# Patient Record
Sex: FEMALE | Race: WHITE | NOT HISPANIC OR LATINO | Employment: FULL TIME | URBAN - METROPOLITAN AREA
[De-identification: names, ages, dates, MRNs, and addresses within clinical notes are randomized per-mention and may not be internally consistent; named-entity substitution may affect disease eponyms.]

---

## 2019-08-08 RX ORDER — LISINOPRIL 40 MG/1
TABLET ORAL
Qty: 30 TABLET | Refills: 0 | Status: SHIPPED | OUTPATIENT
Start: 2019-08-08 | End: 2019-09-12 | Stop reason: SDUPTHER

## 2019-08-08 RX ORDER — FENOFIBRATE 48 MG/1
TABLET, COATED ORAL
Qty: 30 TABLET | Refills: 5 | Status: SHIPPED | OUTPATIENT
Start: 2019-08-08

## 2019-09-12 RX ORDER — LISINOPRIL 40 MG/1
TABLET ORAL
Qty: 30 TABLET | Refills: 0 | Status: SHIPPED | OUTPATIENT
Start: 2019-09-12

## 2020-01-13 RX ORDER — ATORVASTATIN CALCIUM 40 MG/1
TABLET, FILM COATED ORAL
Qty: 10 TABLET | Refills: 0 | Status: SHIPPED | OUTPATIENT
Start: 2020-01-13

## 2020-04-16 ENCOUNTER — TELEMEDICINE (OUTPATIENT)
Dept: CARDIOLOGY | Facility: CLINIC | Age: 40
End: 2020-04-16

## 2020-04-16 VITALS — WEIGHT: 240 LBS | HEIGHT: 62 IN | BODY MASS INDEX: 44.16 KG/M2

## 2020-04-16 DIAGNOSIS — E78.2 MIXED HYPERLIPIDEMIA: ICD-10-CM

## 2020-04-16 DIAGNOSIS — R07.9 CHEST PAIN, UNSPECIFIED TYPE: Primary | ICD-10-CM

## 2020-04-16 DIAGNOSIS — I10 ESSENTIAL HYPERTENSION: ICD-10-CM

## 2020-04-16 PROCEDURE — 99213 OFFICE O/P EST LOW 20 MIN: CPT | Performed by: INTERNAL MEDICINE

## 2020-04-16 RX ORDER — HYDROCHLOROTHIAZIDE 25 MG/1
25 TABLET ORAL DAILY
COMMUNITY
Start: 2020-04-03

## 2020-04-16 RX ORDER — BUPROPION HYDROCHLORIDE 150 MG/1
150 TABLET ORAL DAILY
COMMUNITY

## 2020-04-16 RX ORDER — METOPROLOL SUCCINATE 50 MG/1
50 TABLET, EXTENDED RELEASE ORAL DAILY
COMMUNITY
Start: 2020-04-03

## 2020-04-16 RX ORDER — AMLODIPINE BESYLATE 5 MG/1
5 TABLET ORAL DAILY
Qty: 30 TABLET | Refills: 11 | Status: SHIPPED | OUTPATIENT
Start: 2020-04-16

## 2020-04-16 NOTE — PROGRESS NOTES
"Cardiology Virtual Video Visit      Encounter Date:  04/16/2020    Patient ID:   Radha Infante is a 40 y.o. female.    Reason For Followup:  Hypertension  Uncontrolled hypertension  Episode where patient felt tired and not doing well        Brief Clinical History:  Dear Dr. Dikc, JEFFERY Augustine    I had the pleasure of seeing Radha Infante today in a virtual video format. As you are well aware, this is a 40 y.o. female for office visit via the video conference for help with uncontrolled hypertension and also not feeling well    Interval History:  Patient states she had an episode where she felt very tired and had no energy lasted for several hours this was few days back  Also complaining of not feeling well  Elevated blood pressure  Recent labs done at the primary care physician office in 2 weeks back    Assessment & Plan    Impressions:  Hypertension  Uncontrolled hypertension  Episode where patient felt tired and not doing well  Prior cardiac catheterization with no significant obstructive coronary artery disease      Recommendations:  Get copy of the labs  Patient does not want to be admitted to the hospital or go to the emergency room for further evaluation treatment options secondary to the COVID outbreak concerns  This is a video visit  Patient gave consent for the video visit  Home blood pressure monitoring  Start patient on Norvasc 5 mg p.o. once a day  We spent 35 minutes with the patient for the video visit  You have chosen to receive care through a telehealth visit.  Do you consent to use a video/audio connection for your medical care today? Yes    Objective:    Vitals:  Vitals:    04/16/20 1142   Weight: 109 kg (240 lb)   Height: 157.5 cm (62\")         Allergies:  Allergies   Allergen Reactions   • Iodinated Diagnostic Agents Unknown - High Severity       Medication Review:     Current Outpatient Medications:   •  atorvastatin (LIPITOR) 40 MG tablet, TAKE 1 TABLET BY MOUTH AT BEDTIME , " Disp: 10 tablet, Rfl: 0  •  buPROPion XL (Wellbutrin XL) 150 MG 24 hr tablet, Take 150 mg by mouth Daily., Disp: , Rfl:   •  fenofibrate (TRICOR) 48 MG tablet, TAKE 1 TABLET BY MOUTH ONE TIME A DAY , Disp: 30 tablet, Rfl: 5  •  hydroCHLOROthiazide (HYDRODIURIL) 25 MG tablet, Take 25 mg by mouth Daily., Disp: , Rfl:   •  lisinopril (PRINIVIL,ZESTRIL) 40 MG tablet, TAKE 1 TABLET BY MOUTH ONE TIME A DAY , Disp: 30 tablet, Rfl: 0  •  metoprolol succinate XL (TOPROL-XL) 50 MG 24 hr tablet, Take 50 mg by mouth Daily., Disp: , Rfl:     Family History:  Family History   Problem Relation Age of Onset   • Hypertension Mother    • Diabetes Father    • Hypertension Father    • Hyperlipidemia Father    • Diabetes Maternal Grandmother    • Heart disease Maternal Grandmother    • Heart disease Maternal Grandfather    • Heart attack Maternal Grandfather    • Hypertension Maternal Grandfather    • Stroke Maternal Grandfather    • Cancer Maternal Grandfather        Past Medical History:  Past Medical History:   Diagnosis Date   • Hyperlipidemia    • Hypertension    • MTHFR mutation (methylenetetrahydrofolate reductase) (CMS/HCC)    • Plantar fasciitis    • S/P PDA repair        Past surgical History:  Past Surgical History:   Procedure Laterality Date   • CARDIAC CATHETERIZATION     •  SECTION     • D&C AND LAPAROSCOPY     • PATENT DUCTUS ARTERIOUS LIGATION         Social History:  Social History     Socioeconomic History   • Marital status:      Spouse name: Not on file   • Number of children: Not on file   • Years of education: Not on file   • Highest education level: Not on file   Tobacco Use   • Smoking status: Never Smoker   • Smokeless tobacco: Never Used   Substance and Sexual Activity   • Alcohol use: Never     Frequency: Never   • Drug use: Never   • Sexual activity: Defer       Review of Systems:  The following systems were reviewed as they relate to the cardiovascular system: Constitutional, Eyes, ENT,  Cardiovascular, Respiratory, Gastrointestinal, Integumentary, Neurological, Psychiatric, Hematologic, Endocrine, Musculoskeletal, and Genitourinary. The pertinent cardiovascular findings are reported above with all other cardiovascular points within those systems being negative.    Diagnostic Study Review:     Current Electrocardiogram:  Procedures      NOTE: The following portions of the patient's history were reviewed and updated this visit as appropriate: allergies, current medications, past family history, past medical history, past social history, past surgical history and problem list.    A total of 25 minutes of medical discussion occurred during this encounter.      Novel Coronavirus (COVID-19) Disclaimer:     A proclamation declaring a national emergency concerning the Novel Coronavirus Disease (COVID-19) Outbreak was issued on March 13, 2020 at the direction of the .    This virtual visit was performed with the patient's consent in lieu of the patient's regularly scheduled appointment in order to provide the patient with the opportunity to maintain contact with their healthcare provider while also adhering to social distancing guidelines set forth by the CDC to reduce exposure to the Novel Coronavirus (COVID-19).  Any vital signs obtained during this visit were provided by the patient.

## 2020-05-14 ENCOUNTER — TELEPHONE (OUTPATIENT)
Dept: CARDIOLOGY | Facility: CLINIC | Age: 40
End: 2020-05-14

## 2020-05-14 NOTE — TELEPHONE ENCOUNTER
L/M for Pt to return my call. Pt had requested a letter for PT stating that it was safe for her to do active PT if BP is high. Dr. Esteban is okay with writing the letter.

## 2021-05-26 RX ORDER — AMLODIPINE BESYLATE 5 MG/1
TABLET ORAL
Qty: 30 TABLET | Refills: 0 | OUTPATIENT
Start: 2021-05-26

## 2021-05-26 RX ORDER — FENOFIBRATE 48 MG/1
TABLET, COATED ORAL
Qty: 30 TABLET | Refills: 0 | OUTPATIENT
Start: 2021-05-26

## 2021-05-26 RX ORDER — LISINOPRIL 40 MG/1
TABLET ORAL
Qty: 30 TABLET | Refills: 0 | OUTPATIENT
Start: 2021-05-26

## 2021-05-26 RX ORDER — ATORVASTATIN CALCIUM 40 MG/1
TABLET, FILM COATED ORAL
Qty: 10 TABLET | Refills: 0 | OUTPATIENT
Start: 2021-05-26

## 2025-03-18 ENCOUNTER — OFFICE VISIT (OUTPATIENT)
Dept: CARDIOLOGY | Facility: CLINIC | Age: 45
End: 2025-03-18
Payer: COMMERCIAL

## 2025-03-18 VITALS
HEART RATE: 79 BPM | OXYGEN SATURATION: 100 % | BODY MASS INDEX: 47.15 KG/M2 | HEIGHT: 62 IN | SYSTOLIC BLOOD PRESSURE: 167 MMHG | DIASTOLIC BLOOD PRESSURE: 92 MMHG | WEIGHT: 256.2 LBS

## 2025-03-18 DIAGNOSIS — I1A.0 RESISTANT HYPERTENSION: Primary | ICD-10-CM

## 2025-03-18 DIAGNOSIS — E78.5 HYPERLIPIDEMIA LDL GOAL <100: ICD-10-CM

## 2025-03-18 PROCEDURE — 99203 OFFICE O/P NEW LOW 30 MIN: CPT | Performed by: INTERNAL MEDICINE

## 2025-03-18 PROCEDURE — 93000 ELECTROCARDIOGRAM COMPLETE: CPT | Performed by: INTERNAL MEDICINE

## 2025-03-18 RX ORDER — HYDROCHLOROTHIAZIDE 25 MG/1
25 TABLET ORAL DAILY
Qty: 30 TABLET | Refills: 3 | Status: SHIPPED | OUTPATIENT
Start: 2025-03-18

## 2025-03-18 RX ORDER — METOPROLOL SUCCINATE 50 MG/1
50 TABLET, EXTENDED RELEASE ORAL DAILY
Qty: 90 TABLET | Refills: 0 | Status: SHIPPED | OUTPATIENT
Start: 2025-03-18

## 2025-03-18 RX ORDER — FENOFIBRATE 48 MG/1
48 TABLET, COATED ORAL DAILY
Qty: 90 TABLET | Refills: 0 | Status: SHIPPED | OUTPATIENT
Start: 2025-03-18

## 2025-03-18 RX ORDER — LISINOPRIL 40 MG/1
40 TABLET ORAL DAILY
Qty: 30 TABLET | Refills: 3 | Status: SHIPPED | OUTPATIENT
Start: 2025-03-18

## 2025-03-18 RX ORDER — ATORVASTATIN CALCIUM 40 MG/1
40 TABLET, FILM COATED ORAL
Qty: 90 TABLET | Refills: 0 | Status: SHIPPED | OUTPATIENT
Start: 2025-03-18

## 2025-03-18 RX ORDER — AMLODIPINE BESYLATE 5 MG/1
5 TABLET ORAL DAILY
Qty: 90 TABLET | Refills: 0 | Status: SHIPPED | OUTPATIENT
Start: 2025-03-18

## 2025-03-18 NOTE — PROGRESS NOTES
Cardiology Office Visit      Encounter Date:  03/18/2025    Patient ID:   Radha Infante is a 45 y.o. female.    Reason For Followup:  Resistant hypertension    Brief Clinical History:  Dear Dr. Dick, JEFFERY Augustine    I had the pleasure of seeing Radha Infante today. As you are well aware, this is a 45 y.o. female past medical history that is significant for prior history of long history of hypertension that is poorly controlled with resistant hypertension here for follow-up for further evaluation and treatment options and establishing the care    Interval History:  Resistant hypertension  Poorly controlled blood pressure  Palpitations  Obesity  Palpitations  No exertional chest discomfort  Lower extremity edema    Assessment & Plan    Impressions:  Hypertension poorly controlled resistant hypertension  Lower extremity edema  Obesity  Sleep apnea  Palpitations  Prior history of patent ductus arteriosus status post surgical repair as a child  Prior remote history of cardiac catheterization that was normal  MTHFR gene mutation      Recommendations:  Schedule for an echocardiogram to assess LV systolic function  Schedule for extended Holter monitor study  Check labs including CBC CMP lipids and thyroid profile  Low-salt diet  Regular exercise and weight loss  Schedule for a sleep study for evaluation for obstructive sleep apnea  Likely will benefit from Edarbi chlor but insurance issues with the co-pay patient cannot afford  Current medications include aspirin 81 mg once a day Toprol-XL 50 mg p.o. once a day patient is on amlodipine 5 mg p.o. once daily lisinopril 40 mg p.o. once a day hydrochlorothiazide 25 mg p.o. once a day fenofibrate 45 mg p.o. once a day  Continued aggressive factor modification  Close monitoring of blood pressure at home  Follow-up in office in 2 months        Vitals:  Vitals:    03/18/25 0904   BP: 167/92   Pulse: 79   SpO2: 100%   Weight: 116 kg (256 lb 3.2 oz)   Height: 157.5  "cm (62\")       Physical Exam:    General: Alert, cooperative, no distress, appears stated age  Head:  Normocephalic, atraumatic, mucous membranes moist  Eyes:  Conjunctiva/corneas clear, EOM's intact     Neck:  Supple,  no adenopathy;      Lungs: Clear to auscultation bilaterally, no wheezes rhonchi rales are noted  Chest wall: No tenderness  Heart::  Regular rate and rhythm, S1 and S2 normal, no murmur, rub or gallop  Abdomen: Soft, non-tender, nondistended bowel sounds active  Extremities: No cyanosis, clubbing, or edema  Pulses: 2+ and symmetric all extremities  Skin:  No rashes or lesions  Neuro/psych: A&O x3. CN II through XII are grossly intact with appropriate affect              No results found for: \"GLUCOSE\", \"BUN\", \"CREATININE\", \"EGFRRESULT\", \"EGFR\", \"BCR\", \"K\", \"CO2\", \"CALCIUM\", \"PROTENTOTREF\", \"ALBUMIN\", \"BILITOT\", \"AST\", \"ALT\"     No results found for this or any previous visit.     No results found for: \"CHOL\", \"CHLPL\", \"TRIG\", \"HDL\", \"LDL\", \"LDLDIRECT\"    Results for orders placed in visit on 08/16/17    CARDIOVASCULAR STUDIES - CONVERTED    Narrative  Cardiovascular Test/Nuclear Stress Test/InPt      Imported By: Atiya Monroy CMA 8/17/2017 8:21:41 AM    _____________________________________________________________________    External Attachment:    Type: Image  Comment:  External Document      Signed before import by Levi Lim MD  Filed automatically on 08/17/2017 at 8:22 AM  ________________________________________________________________________      Disclaimer: Converted Note message may not contain all data elements that existed in the legacy source system. Please see [facility] for the original note details.           Objective:          Allergies:  Allergies   Allergen Reactions    Iodinated Contrast Media Unknown - High Severity       Medication Review:     Current Outpatient Medications:     amLODIPine (NORVASC) 5 MG tablet, Take 1 tablet by mouth " Daily., Disp: 30 tablet, Rfl: 11    aspirin 81 MG EC tablet, Take 1 tablet by mouth Daily., Disp: , Rfl:     fenofibrate (TRICOR) 48 MG tablet, TAKE 1 TABLET BY MOUTH ONE TIME A DAY , Disp: 30 tablet, Rfl: 5    hydroCHLOROthiazide 25 MG tablet, Take 1 tablet by mouth Daily., Disp: 30 tablet, Rfl: 3    lisinopril (PRINIVIL,ZESTRIL) 40 MG tablet, Take 1 tablet by mouth Daily., Disp: 30 tablet, Rfl: 3    metoprolol succinate XL (TOPROL-XL) 50 MG 24 hr tablet, Take 1 tablet by mouth Daily., Disp: , Rfl:     Family History:  Family History   Problem Relation Age of Onset    Hypertension Mother     Diabetes Father     Hypertension Father     Hyperlipidemia Father     Diabetes Maternal Grandmother     Heart disease Maternal Grandmother     Heart disease Maternal Grandfather     Heart attack Maternal Grandfather     Hypertension Maternal Grandfather     Stroke Maternal Grandfather     Cancer Maternal Grandfather     Heart attack Paternal Grandfather        Past Medical History:  Past Medical History:   Diagnosis Date    Heart murmur     PDA repair    Hyperlipidemia     Hypertension     MTHFR mutation (methylenetetrahydrofolate reductase)     Plantar fasciitis     S/P PDA repair     Sleep apnea 2019       Past surgical History:  Past Surgical History:   Procedure Laterality Date    CARDIAC CATHETERIZATION       SECTION      D & C AND LAPAROSCOPY      PATENT DUCTUS ARTERIOUS LIGATION         Social History:  Social History     Socioeconomic History    Marital status:    Tobacco Use    Smoking status: Never    Smokeless tobacco: Never   Vaping Use    Vaping status: Never Used   Substance and Sexual Activity    Alcohol use: Never    Drug use: Never    Sexual activity: Yes     Partners: Male     Birth control/protection: None       Review of Systems:  The following systems were reviewed as they relate to the cardiovascular system: Constitutional, Eyes, ENT, Cardiovascular, Respiratory, Gastrointestinal,  Integumentary, Neurological, Psychiatric, Hematologic, Endocrine, Musculoskeletal, and Genitourinary. The pertinent cardiovascular findings are reported above with all other cardiovascular points within those systems being negative.    Diagnostic Study Review:     Current Electrocardiogram:    ECG 12 Lead    Date/Time: 3/18/2025 10:29 AM  Performed by: Levi Lim MD    Authorized by: Levi iLm MD  Comparison: compared with previous ECG   Similar to previous ECG  Rhythm: sinus rhythm  Rate: normal  BPM: 79  Conduction: conduction normal  QRS axis: normal    Clinical impression: normal ECG                NOTE: The following portions of the patient's history were reviewed and updated this visit as appropriate: allergies, current medications, past family history, past medical history, past social history, past surgical history and problem list.

## 2025-03-26 DIAGNOSIS — R00.2 PALPITATIONS: Primary | ICD-10-CM

## 2025-03-27 ENCOUNTER — HOSPITAL ENCOUNTER (OUTPATIENT)
Dept: CARDIOLOGY | Facility: HOSPITAL | Age: 45
Discharge: HOME | End: 2025-03-27
Admitting: INTERNAL MEDICINE
Payer: COMMERCIAL

## 2025-03-27 ENCOUNTER — APPOINTMENT (OUTPATIENT)
Dept: CARDIOLOGY | Facility: HOSPITAL | Age: 45
End: 2025-03-27
Payer: COMMERCIAL

## 2025-03-27 VITALS
SYSTOLIC BLOOD PRESSURE: 126 MMHG | BODY MASS INDEX: 47.11 KG/M2 | WEIGHT: 256 LBS | HEIGHT: 62 IN | DIASTOLIC BLOOD PRESSURE: 83 MMHG | HEART RATE: 94 BPM

## 2025-03-27 DIAGNOSIS — E78.5 HYPERLIPIDEMIA LDL GOAL <100: ICD-10-CM

## 2025-03-27 DIAGNOSIS — I1A.0 RESISTANT HYPERTENSION: ICD-10-CM

## 2025-03-27 LAB
AORTIC DIMENSIONLESS INDEX: 1.02 (DI)
AV MEAN PRESS GRAD SYS DOP V1V2: 4.6 MMHG
AV VMAX SYS DOP: 138 CM/SEC
BH CV ECHO LEFT VENTRICLE GLOBAL LONGITUDINAL STRAIN: -17.2 %
BH CV ECHO MEAS - ACS: 2.39 CM
BH CV ECHO MEAS - AO MAX PG: 7.6 MMHG
BH CV ECHO MEAS - AO ROOT DIAM: 3.5 CM
BH CV ECHO MEAS - AO V2 VTI: 24.1 CM
BH CV ECHO MEAS - AVA(I,D): 3 CM2
BH CV ECHO MEAS - EDV(CUBED): 50.5 ML
BH CV ECHO MEAS - EDV(MOD-SP4): 91.3 ML
BH CV ECHO MEAS - EF(MOD-SP4): 67.6 %
BH CV ECHO MEAS - ESV(CUBED): 14 ML
BH CV ECHO MEAS - ESV(MOD-SP4): 29.5 ML
BH CV ECHO MEAS - FS: 34.8 %
BH CV ECHO MEAS - IVS/LVPW: 1.03 CM
BH CV ECHO MEAS - IVSD: 1.27 CM
BH CV ECHO MEAS - LA DIMENSION: 3.3 CM
BH CV ECHO MEAS - LAT PEAK E' VEL: 9.3 CM/SEC
BH CV ECHO MEAS - LV DIASTOLIC VOL/BSA (35-75): 43 CM2
BH CV ECHO MEAS - LV MASS(C)D: 156.6 GRAMS
BH CV ECHO MEAS - LV MAX PG: 5.4 MMHG
BH CV ECHO MEAS - LV MEAN PG: 3.3 MMHG
BH CV ECHO MEAS - LV SYSTOLIC VOL/BSA (12-30): 13.9 CM2
BH CV ECHO MEAS - LV V1 MAX: 116.5 CM/SEC
BH CV ECHO MEAS - LV V1 VTI: 24.5 CM
BH CV ECHO MEAS - LVIDD: 3.7 CM
BH CV ECHO MEAS - LVIDS: 2.41 CM
BH CV ECHO MEAS - LVOT AREA: 3 CM2
BH CV ECHO MEAS - LVOT DIAM: 1.94 CM
BH CV ECHO MEAS - LVPWD: 1.23 CM
BH CV ECHO MEAS - MED PEAK E' VEL: 8.8 CM/SEC
BH CV ECHO MEAS - MR MAX PG: 60.9 MMHG
BH CV ECHO MEAS - MR MAX VEL: 389.1 CM/SEC
BH CV ECHO MEAS - MV A MAX VEL: 76.5 CM/SEC
BH CV ECHO MEAS - MV DEC SLOPE: 470.6 CM/SEC2
BH CV ECHO MEAS - MV DEC TIME: 0.18 SEC
BH CV ECHO MEAS - MV E MAX VEL: 86 CM/SEC
BH CV ECHO MEAS - MV E/A: 1.12
BH CV ECHO MEAS - MV MAX PG: 6 MMHG
BH CV ECHO MEAS - MV MEAN PG: 2.7 MMHG
BH CV ECHO MEAS - MV V2 VTI: 28.2 CM
BH CV ECHO MEAS - MVA(VTI): 2.6 CM2
BH CV ECHO MEAS - PA ACC TIME: 0.09 SEC
BH CV ECHO MEAS - PA V2 MAX: 118.4 CM/SEC
BH CV ECHO MEAS - PI END-D VEL: 126.2 CM/SEC
BH CV ECHO MEAS - PULM A REVS DUR: 0.12 SEC
BH CV ECHO MEAS - PULM A REVS VEL: 35.5 CM/SEC
BH CV ECHO MEAS - PULM DIAS VEL: 43.3 CM/SEC
BH CV ECHO MEAS - PULM S/D: 1.22
BH CV ECHO MEAS - PULM SYS VEL: 52.8 CM/SEC
BH CV ECHO MEAS - QP/QS: 0.82
BH CV ECHO MEAS - RAP SYSTOLE: 3 MMHG
BH CV ECHO MEAS - RV MAX PG: 4.1 MMHG
BH CV ECHO MEAS - RV V1 MAX: 101.1 CM/SEC
BH CV ECHO MEAS - RV V1 VTI: 18.7 CM
BH CV ECHO MEAS - RVDD: 2.8 CM
BH CV ECHO MEAS - RVOT DIAM: 2.01 CM
BH CV ECHO MEAS - RVSP: 36 MMHG
BH CV ECHO MEAS - SV(LVOT): 72.5 ML
BH CV ECHO MEAS - SV(MOD-SP4): 61.8 ML
BH CV ECHO MEAS - SV(RVOT): 59.3 ML
BH CV ECHO MEAS - SVI(LVOT): 34.2 ML/M2
BH CV ECHO MEAS - SVI(MOD-SP4): 29.1 ML/M2
BH CV ECHO MEAS - TAPSE (>1.6): 2.29 CM
BH CV ECHO MEAS - TR MAX PG: 33 MMHG
BH CV ECHO MEAS - TR MAX VEL: 287 CM/SEC
BH CV ECHO MEASUREMENTS AVERAGE E/E' RATIO: 9.5
LV EF BIPLANE MOD: 67.6 %

## 2025-03-27 PROCEDURE — 93306 TTE W/DOPPLER COMPLETE: CPT | Performed by: INTERNAL MEDICINE

## 2025-03-27 PROCEDURE — 93356 MYOCRD STRAIN IMG SPCKL TRCK: CPT | Performed by: INTERNAL MEDICINE

## 2025-03-27 PROCEDURE — 93356 MYOCRD STRAIN IMG SPCKL TRCK: CPT

## 2025-03-27 PROCEDURE — 93306 TTE W/DOPPLER COMPLETE: CPT

## 2025-03-28 ENCOUNTER — RESULTS FOLLOW-UP (OUTPATIENT)
Dept: CARDIOLOGY | Facility: CLINIC | Age: 45
End: 2025-03-28
Payer: COMMERCIAL

## 2025-03-28 NOTE — TELEPHONE ENCOUNTER
Called and spoke with the patient. Informing the patient of her ECHO results commented below. Patient acknowledged this information.     ----- Message from Levi Lim sent at 3/27/2025  4:58 PM EDT -----  Echocardiogram looks normal  ----- Message -----  From: Levi Lim MD  Sent: 3/27/2025   4:57 PM EDT  To: Levi Lim MD

## 2025-03-31 ENCOUNTER — HOSPITAL ENCOUNTER (OUTPATIENT)
Dept: RESPIRATORY THERAPY | Facility: HOSPITAL | Age: 45
Discharge: HOME | End: 2025-03-31
Admitting: INTERNAL MEDICINE
Payer: COMMERCIAL

## 2025-03-31 DIAGNOSIS — R00.2 PALPITATIONS: ICD-10-CM

## 2025-03-31 PROCEDURE — 93242 EXT ECG>48HR<7D RECORDING: CPT

## 2025-04-02 ENCOUNTER — LAB (OUTPATIENT)
Dept: LAB | Facility: HOSPITAL | Age: 45
End: 2025-04-02
Payer: COMMERCIAL

## 2025-04-02 ENCOUNTER — HOSPITAL ENCOUNTER (OUTPATIENT)
Dept: CARDIOLOGY | Facility: HOSPITAL | Age: 45
Discharge: HOME | End: 2025-04-02
Payer: COMMERCIAL

## 2025-04-02 DIAGNOSIS — E78.5 HYPERLIPIDEMIA LDL GOAL <100: ICD-10-CM

## 2025-04-02 DIAGNOSIS — I1A.0 RESISTANT HYPERTENSION: ICD-10-CM

## 2025-04-02 LAB
ALBUMIN SERPL-MCNC: 3.7 G/DL (ref 3.5–5.2)
ALBUMIN/GLOB SERPL: 1.1 G/DL
ALP SERPL-CCNC: 103 U/L (ref 39–117)
ALT SERPL W P-5'-P-CCNC: 21 U/L (ref 1–33)
ANION GAP SERPL CALCULATED.3IONS-SCNC: 9.1 MMOL/L (ref 5–15)
AST SERPL-CCNC: 26 U/L (ref 1–32)
BASOPHILS # BLD AUTO: 0.05 10*3/MM3 (ref 0–0.2)
BASOPHILS NFR BLD AUTO: 0.6 % (ref 0–1.5)
BH CV ECHO MEAS - DIST REN A EDV LEFT: 28.1 CM/S
BH CV ECHO MEAS - DIST REN A PSV LEFT: 89.8 CM/S
BH CV ECHO MEAS - MID REN A EDV LEFT: 27.3 CM/S
BH CV ECHO MEAS - MID REN A PSV LEFT: 101 CM/S
BH CV ECHO MEAS - PROX REN A EDV LEFT: 18.1 CM/S
BH CV ECHO MEAS - PROX REN A PSV LEFT: 70.4 CM/S
BH CV VAS RENAL AORTIC MID PSV: 144 CM/S
BH CV VAS RENAL HILUM LEFT EDV: 24.3 CM/S
BH CV VAS RENAL HILUM LEFT PSV: 83.3 CM/S
BH CV VAS RENAL HILUM RIGHT EDV: 18.7 CM/S
BH CV VAS RENAL HILUM RIGHT PSV: 44.5 CM/S
BH CV XLRA MEAS DIST REN A EDV RIGHT: 42.2 CM/S
BH CV XLRA MEAS DIST REN A PSV RIGHT: 101 CM/S
BH CV XLRA MEAS MID REN A EDV RIGHT: 25.8 CM/S
BH CV XLRA MEAS MID REN A PSV RIGHT: 65.4 CM/S
BH CV XLRA MEAS PROX REN A EDV RIGHT: 18.9 CM/S
BH CV XLRA MEAS PROX REN A PSV RIGHT: 60.2 CM/S
BH CV XLRA MEAS RAR LEFT: 0.71
BH CV XLRA MEAS RAR RIGHT: 1.2
BH CV XLRA MEAS RENAL A ORG EDV LEFT: 34.1 CM/S
BH CV XLRA MEAS RENAL A ORG EDV RIGHT: 62.8 CM/S
BH CV XLRA MEAS RENAL A ORG PSV LEFT: 102 CM/S
BH CV XLRA MEAS RENAL A ORG PSV RIGHT: 175 CM/S
BILIRUB SERPL-MCNC: 0.3 MG/DL (ref 0–1.2)
BUN SERPL-MCNC: 11 MG/DL (ref 6–20)
BUN/CREAT SERPL: 12.2 (ref 7–25)
CALCIUM SPEC-SCNC: 8.9 MG/DL (ref 8.6–10.5)
CHLORIDE SERPL-SCNC: 102 MMOL/L (ref 98–107)
CHOLEST SERPL-MCNC: 145 MG/DL (ref 0–200)
CO2 SERPL-SCNC: 24.9 MMOL/L (ref 22–29)
CREAT SERPL-MCNC: 0.9 MG/DL (ref 0.57–1)
DEPRECATED RDW RBC AUTO: 44 FL (ref 37–54)
EGFRCR SERPLBLD CKD-EPI 2021: 80.5 ML/MIN/1.73
EOSINOPHIL # BLD AUTO: 0.16 10*3/MM3 (ref 0–0.4)
EOSINOPHIL NFR BLD AUTO: 2 % (ref 0.3–6.2)
ERYTHROCYTE [DISTWIDTH] IN BLOOD BY AUTOMATED COUNT: 15.2 % (ref 12.3–15.4)
GLOBULIN UR ELPH-MCNC: 3.3 GM/DL
GLUCOSE SERPL-MCNC: 103 MG/DL (ref 65–99)
HCT VFR BLD AUTO: 36.1 % (ref 34–46.6)
HDLC SERPL-MCNC: 47 MG/DL (ref 40–60)
HGB BLD-MCNC: 11.4 G/DL (ref 12–15.9)
IMM GRANULOCYTES # BLD AUTO: 0.02 10*3/MM3 (ref 0–0.05)
IMM GRANULOCYTES NFR BLD AUTO: 0.3 % (ref 0–0.5)
LDLC SERPL CALC-MCNC: 83 MG/DL (ref 0–100)
LDLC/HDLC SERPL: 1.77 {RATIO}
LEFT RENAL UPPER PARENCHYMA MAX: 21.5 CM/S
LEFT RENAL UPPER PARENCHYMA MIN: 7.3 CM/S
LEFT RENAL UPPER PARENCHYMA RI: 0.66
LYMPHOCYTES # BLD AUTO: 2.41 10*3/MM3 (ref 0.7–3.1)
LYMPHOCYTES NFR BLD AUTO: 30.8 % (ref 19.6–45.3)
MCH RBC QN AUTO: 25.4 PG (ref 26.6–33)
MCHC RBC AUTO-ENTMCNC: 31.6 G/DL (ref 31.5–35.7)
MCV RBC AUTO: 80.6 FL (ref 79–97)
MONOCYTES # BLD AUTO: 0.55 10*3/MM3 (ref 0.1–0.9)
MONOCYTES NFR BLD AUTO: 7 % (ref 5–12)
NEUTROPHILS NFR BLD AUTO: 4.63 10*3/MM3 (ref 1.7–7)
NEUTROPHILS NFR BLD AUTO: 59.3 % (ref 42.7–76)
NRBC BLD AUTO-RTO: 0 /100 WBC (ref 0–0.2)
PLATELET # BLD AUTO: 334 10*3/MM3 (ref 140–450)
PMV BLD AUTO: 11 FL (ref 6–12)
POTASSIUM SERPL-SCNC: 3.9 MMOL/L (ref 3.5–5.2)
PROT SERPL-MCNC: 7 G/DL (ref 6–8.5)
RBC # BLD AUTO: 4.48 10*6/MM3 (ref 3.77–5.28)
RIGHT RENAL UPPER PARENCHYMA MAX: 22.7 CM/S
RIGHT RENAL UPPER PARENCHYMA MIN: 9.5 CM/S
RIGHT RENAL UPPER PARENCHYMA RI: 0.58
SODIUM SERPL-SCNC: 136 MMOL/L (ref 136–145)
TRIGL SERPL-MCNC: 74 MG/DL (ref 0–150)
TSH SERPL DL<=0.05 MIU/L-ACNC: 2.45 UIU/ML (ref 0.27–4.2)
VLDLC SERPL-MCNC: 15 MG/DL (ref 5–40)
WBC NRBC COR # BLD AUTO: 7.82 10*3/MM3 (ref 3.4–10.8)

## 2025-04-02 PROCEDURE — 80061 LIPID PANEL: CPT

## 2025-04-02 PROCEDURE — 36415 COLL VENOUS BLD VENIPUNCTURE: CPT

## 2025-04-02 PROCEDURE — 80050 GENERAL HEALTH PANEL: CPT

## 2025-04-02 PROCEDURE — 82088 ASSAY OF ALDOSTERONE: CPT

## 2025-04-02 PROCEDURE — 84244 ASSAY OF RENIN: CPT

## 2025-04-02 PROCEDURE — 93975 VASCULAR STUDY: CPT

## 2025-04-08 LAB
ALDOST SERPL-MCNC: 8.6 NG/DL (ref 0–30)
ALDOST/RENIN PLAS-RTO: 1.5 {RATIO} (ref 0–30)
RENIN PLAS-CCNC: 5.6 NG/ML/HR (ref 0.17–5.38)

## 2025-04-09 LAB
CV ZIO BASELINE AVG BPM: 85 BPM
CV ZIO BASELINE BPM HIGH: 145 BPM
CV ZIO BASELINE BPM LOW: 54 BPM
CV ZIO DEVICE ANALYSIS TIME: NORMAL
CV ZIO ECT SVE COUNT: 68 EPISODES
CV ZIO ECT SVE CPLT COUNT: 2 EPISODES
CV ZIO ECT SVE CPLT FREQ: NORMAL
CV ZIO ECT SVE FREQ: NORMAL
CV ZIO ECT SVE TPLT COUNT: 2 EPISODES
CV ZIO ECT SVE TPLT FREQ: NORMAL
CV ZIO ECT VE COUNT: 23 EPISODES
CV ZIO ECT VE CPLT COUNT: 0 EPISODES
CV ZIO ECT VE CPLT FREQ: 0
CV ZIO ECT VE FREQ: NORMAL
CV ZIO ECT VE TPLT COUNT: 0 EPISODES
CV ZIO ECT VE TPLT FREQ: 0
CV ZIO ECTOPIC SVE COUPLET RAW PERCENT: 0 %
CV ZIO ECTOPIC SVE ISOLATED PERCENT: 0.02 %
CV ZIO ECTOPIC SVE TRIPLET RAW PERCENT: 0 %
CV ZIO ECTOPIC VE COUPLET RAW PERCENT: 0 %
CV ZIO ECTOPIC VE ISOLATED PERCENT: 0.01 %
CV ZIO ECTOPIC VE TRIPLET RAW PERCENT: 0 %
CV ZIO ENROLLMENT END: NORMAL
CV ZIO ENROLLMENT START: NORMAL
CV ZIO PATIENT EVENTS DIARIES: 1
CV ZIO PATIENT EVENTS TRIGGERS: 1
CV ZIO PAUSE COUNT: 0
CV ZIO PRESCRIPTION STATUS: NORMAL
CV ZIO SVT COUNT: 0
CV ZIO TOTAL  ENROLLMENT PERIOD: NORMAL
CV ZIO VT COUNT: 0

## 2025-05-07 ENCOUNTER — NURSE TRIAGE (OUTPATIENT)
Dept: CALL CENTER | Facility: HOSPITAL | Age: 45
End: 2025-05-07
Payer: COMMERCIAL

## 2025-05-07 NOTE — TELEPHONE ENCOUNTER
"Was requesting a refill of medication given to her in ED on Monday night.  Advised she would have to be seen again.  Reason for Disposition   Caller requesting a CONTROLLED substance prescription refill (e.g., narcotics, ADHD medicines)    Additional Information   Negative: New-onset or worsening symptoms, see that guideline (e.g., diarrhea, runny nose, sore throat)   Negative: Medicine question not related to refill or renewal   Negative: Caller (e.g., patient or pharmacist) requesting information about a new medicine   Negative: Caller requesting information unrelated to medicine   Negative: [1] Prescription refill request for ESSENTIAL medicine (i.e., likelihood of harm to patient if not taken) AND [2] triager unable to refill per department policy   Negative: [1] Prescription not at pharmacy AND [2] was prescribed by PCP recently  (Exception: Triager has access to EMR and prescription is recorded there. Go to Home Care and confirm for pharmacy.)   Negative: [1] Pharmacy calling with prescription questions AND [2] triager unable to answer question   Negative: Prescription request for new medicine (not a refill)    Answer Assessment - Initial Assessment Questions  1. DRUG NAME: \"What medicine do you need to have refilled?\"      Pain medication and steroids  2. REFILLS REMAINING: \"How many refills are remaining?\" (Note: The label on the medicine or pill bottle will show how many refills are remaining. If there are no refills remaining, then a renewal may be needed.)      none  3. EXPIRATION DATE: \"What is the expiration date?\" (Note: The label states when the prescription will , and thus can no longer be refilled.)      na  4. PRESCRIBING HCP: \"Who prescribed it?\" Reason: If prescribed by specialist, call should be referred to that group.      ED  5. SYMPTOMS: \"Do you have any symptoms?\"      toothache  6. PREGNANCY: \"Is there any chance that you are pregnant?\" \"When was your last menstrual period?\"      " na    Protocols used: Medication Refill and Renewal Call-ADULT-AH

## 2025-05-13 ENCOUNTER — TELEPHONE (OUTPATIENT)
Dept: CARDIOLOGY | Facility: CLINIC | Age: 45
End: 2025-05-13
Payer: COMMERCIAL

## 2025-05-13 NOTE — TELEPHONE ENCOUNTER
Patient informed, she is going to go to a different provider. She will call us with the name and phone number when she decides who she is seeing     ---- Message from Levi Lim sent at 5/13/2025 11:52 AM EDT -----  Okay to proceed with the dental cleaning appointment  ----- Message -----  From: Luz Marina Swanson MA  Sent: 5/13/2025   9:45 AM EDT  To: Levi Lim MD    Patient called and left me a voice mail. Stating that she has a dental cleaning today at 2 pm. Patient stated that the dentist is wanting to know if she is okay to be seen. With her BP issues. Please advise, and I will call the facility and inform them.

## 2025-05-27 ENCOUNTER — OFFICE VISIT (OUTPATIENT)
Dept: SLEEP MEDICINE | Facility: CLINIC | Age: 45
End: 2025-05-27
Payer: COMMERCIAL

## 2025-05-27 VITALS
DIASTOLIC BLOOD PRESSURE: 82 MMHG | HEIGHT: 62 IN | SYSTOLIC BLOOD PRESSURE: 141 MMHG | BODY MASS INDEX: 46.59 KG/M2 | HEART RATE: 72 BPM | OXYGEN SATURATION: 100 % | WEIGHT: 253.2 LBS

## 2025-05-27 DIAGNOSIS — G47.30 OBSERVED SLEEP APNEA: Primary | ICD-10-CM

## 2025-05-27 DIAGNOSIS — G47.8 NON-RESTORATIVE SLEEP: ICD-10-CM

## 2025-05-27 DIAGNOSIS — G47.19 EXCESSIVE DAYTIME SLEEPINESS: ICD-10-CM

## 2025-05-27 DIAGNOSIS — I1A.0 RESISTANT HYPERTENSION: ICD-10-CM

## 2025-05-27 DIAGNOSIS — R06.83 SNORING: ICD-10-CM

## 2025-05-27 DIAGNOSIS — E66.813 CLASS 3 SEVERE OBESITY DUE TO EXCESS CALORIES WITHOUT SERIOUS COMORBIDITY WITH BODY MASS INDEX (BMI) OF 40.0 TO 44.9 IN ADULT: ICD-10-CM

## 2025-05-27 DIAGNOSIS — E66.01 CLASS 3 SEVERE OBESITY DUE TO EXCESS CALORIES WITHOUT SERIOUS COMORBIDITY WITH BODY MASS INDEX (BMI) OF 40.0 TO 44.9 IN ADULT: ICD-10-CM

## 2025-05-27 PROCEDURE — 99204 OFFICE O/P NEW MOD 45 MIN: CPT | Performed by: INTERNAL MEDICINE

## 2025-05-27 PROCEDURE — G0463 HOSPITAL OUTPT CLINIC VISIT: HCPCS

## 2025-05-27 NOTE — PROGRESS NOTES
[facility]  Sleep Medicine  [address], [address]  [place] [address]  Phone   Fax       Radha Infante  [id number]   1980  45 y.o.  female      Referring physician/provider Levi Lim MD  PCP Lore Dikc APRN    Type of service: Initial Sleep Medicine Consult.  Date of service: 5/27/2025      Chief Complaint   Patient presents with    Daytime Sleepiness    Snoring    Witnessed Apnea    Morning Headaches    Frequent Awakenings       History of present illness;  Thank you for asking to see Radha Infante, 45 y.o.. The patient was seen today on 5/27/2025 at [facility].  The patient presents today with symptoms of snoring, non-restorative sleep and witnessed apneas. The symptoms are present for more than 5 years and they are persistent in nature.  The snoring is present in all positions and it is loud.  Patient has no prior surgery namely tonsillectomy, nasal surgery and UPPP.  She works 2 jobs.  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  She has a very strong family history of sleep apnea    Patient gives the following sleep history.  Sleep schedule:  Bedtime: Between 930 to 10:30 PM  Wake time: 7 AM  Normally takes about 20-30 minutes to fall asleep  Average hours of sleep 8  Number of naps per day occasionally 1  Symptoms   In addition to snoring, nonrestorative sleep and witnessed apneas patient gives the following associated symptoms.  Have you ever awakened gasping for breath, coughing, choking: Yes   Change in weight,  No   Morning headaches  Yes   Awaken with a sore throat or dry mouth  Yes   Leg jerking at night:  No   Crawly feeling/urge sensation to move in the legs: Yes   Teeth grinding:Yes   Have you ever awakened at night with a sour taste or burning sensation in your chest:  No   Do you have muscle weakness with laughing or  "anger or sleep paralysis:  No   Have you ever felt paralyzed while going to sleep or waking up:  No   Sleepwalking, nightmares, No   Nocturia (urination at night): 2 times per night  Memory Problem:No     Adapted from STOP-BANG Questionnaire  Joy F et al. Anesthesiology 2008;108:812-21.    Does the patient SNORE? Yes    Does the patient feel TIRED, fatigued or sleepy during the day? Yes    Has anyone OBSERVED the stop breathing or cough/gasp during sleep? Yes    Does the patient have high blood PRESSURE? Yes    Is the patient's BMI greater than 35? Yes  Body mass index is 47.07 kg/m².   Is the patient’s AGE over 50 years old? No  45 y.o.   Is the patient's NECK size greater than 17 in for a male and 16 in for a female? No  Neck Circumference: 15.5 inches   Is the patient’s GENDER male? No       Total \"Yes\" Responses; 5    0-2 \"Yes\" Responses = Low Risk of MACK  3-4 \"Yes\" Responses = Intermediate Risk of MACK  5-8 \"Yes\" Responses = High Risk MACK    MEDICAL CONDITIONS (PMH)   Hypertension  Hyperlipidemia    Social history:  Do you drive a commercial vehicle:  No   Shift work:  No   Tobacco use:  No   Alcohol use: 0 per week  Caffeinated drinks: 3    Family Hx (parents and siblings) (pertaining to sleep medicine)  Hypertension  Sleep apnea, mother and her son    Medications: reviewed    Review of systems:  Positive symptoms are :  Snoring  Witnessed apnea  Daytime excessive sleepiness with [place] Sleepiness Scale of Total score: 7   Fatigue  Morning headache      Physical exam:  CONSTITUTINONAL:  Vitals:    05/27/25 1300   BP: 141/82   BP Location: Left arm   Patient Position: Sitting   Pulse: 72   SpO2: 100%   Weight: 115 kg (253 lb 3.2 oz)   Height: 156.2 cm (61.5\")    Body mass index is 47.07 kg/m².   NOSE:no nasal septal defects, nasal passages are clear, no nasal polyps,   THROAT: tonsils are not enlarged, tongue normal size, oral airway Mallampati class 3  NECK:Neck Circumference: 15.5 inches, trachea is in the " midline, thyroid not enlarged  RESPIRATORY SYSTEM: Breath sounds are normal, there are no wheezes  CARDIOVASULAR SYSTEM: Heart sounds are regular rhythm and normal rate, no edema  NEUROLOGICAL SYSTEM: Oriented x 3, No speech defect, gait is normal  PSYCHIATRIC SYSTEM: Mood is normal, thought content is normal    Office notes from care team reviewed. Office note dated March 18, 2025,reviewed  Labs reviewed.  TSH Results:  TSH          4/2/2025    08:39   TSH   TSH 2.450            Assessment and plan:  Witnessed apneas,(R06.81) patient's symptoms and examination is consistent with sleep apnea (G47.30). I have talked to the patient about the signs and symptoms of sleep apnea. In addition, I have also discussed pathophysiology of sleep apnea.  I also discussed the complications of untreated sleep apnea including effects on hypertension, diabetes mellitus and nonrestorative sleep with hypersomnia which can increase risk for motor vehicle accidents.  Untreated sleep apnea is also a risk factor for development of atrial fibrillation, pulmonary hypertension, and insulin resistance and stroke.  Discussed in detail of various testing methods including home-based and lab based sleep studies.  Based on history and physical examination and other comorbidities the most appropriate study is home sleep test.  The order for the sleep study is placed in [facility].  The test will be scheduled after approval from insurance. Treatment and management will be discussed after the test is completed.  Patient was given opportunity to ask questions and all the questions were answered.   Snoring (R06.83), snoring is the sound created by turbulent airflow vibrating upper airway soft tissue due to limitation of inspiratory airflow. I have also discussed factors affecting snoring including sleep deprivation, sleeping on the back and alcohol ingestion. To minimize snoring, patient is advised to have adequate sleep, sleep on the side and avoid alcohol  and sedative medications before bedtime  Daytime excessive sleepiness .  It was assessed with [place] Sleepiness Scale of Total score: 7.  There are many causes for daytime excessive sleepiness including sleep depression, shiftwork syndrome, depression and other medical disorders including heart, kidney and liver failure.  The most serious cause of excessive sleepiness is due to neurological conditions like narcolepsy/cataplexy.  But the most common cause of excessive sleepiness is due to sleep apnea with frequent awakenings during sleep time.  I have discussed safety of driving and to remain vigilant while driving.  Obesity 3, patient's BMI is Body mass index is 47.07 kg/m².. I have discussed the relationship between weight and sleep apnea.There is direct correlation between weight and severity of sleep apnea.  Weight reduction is encouraged, as it is going to reduce the severity of sleep apnea. I have also discussed with the patient diet and exercise to achieve ideal body weight.  Hypertension, resistant hypertension requiring 2 medication.  Essential hypertension is highly correlated with sleep apnea. Treating sleep apnea will assist in good blood pressure control.  Return for 31 to 90 days after PAP setup with down load..  Patient's questions were answered      I once again thank you for asking me to see this patient in consultation and I have forwarded my opinion and treatment plan.  Please do not hesitate to call me if you have any questions.   5/27/2025  Aminata Dove MD  Sleep Medicine  Medical Director  [facility]: [facility]

## 2025-06-10 ENCOUNTER — OFFICE VISIT (OUTPATIENT)
Dept: CARDIOLOGY | Facility: CLINIC | Age: 45
End: 2025-06-10
Payer: COMMERCIAL

## 2025-06-10 VITALS
WEIGHT: 254.4 LBS | DIASTOLIC BLOOD PRESSURE: 110 MMHG | SYSTOLIC BLOOD PRESSURE: 163 MMHG | HEART RATE: 70 BPM | BODY MASS INDEX: 46.81 KG/M2 | HEIGHT: 62 IN | OXYGEN SATURATION: 100 %

## 2025-06-10 DIAGNOSIS — E66.01 CLASS 3 SEVERE OBESITY DUE TO EXCESS CALORIES WITHOUT SERIOUS COMORBIDITY WITH BODY MASS INDEX (BMI) OF 40.0 TO 44.9 IN ADULT: ICD-10-CM

## 2025-06-10 DIAGNOSIS — I1A.0 RESISTANT HYPERTENSION: Primary | ICD-10-CM

## 2025-06-10 DIAGNOSIS — E78.5 HYPERLIPIDEMIA LDL GOAL <100: ICD-10-CM

## 2025-06-10 DIAGNOSIS — E66.813 CLASS 3 SEVERE OBESITY DUE TO EXCESS CALORIES WITHOUT SERIOUS COMORBIDITY WITH BODY MASS INDEX (BMI) OF 40.0 TO 44.9 IN ADULT: ICD-10-CM

## 2025-06-10 PROCEDURE — 99214 OFFICE O/P EST MOD 30 MIN: CPT | Performed by: INTERNAL MEDICINE

## 2025-06-10 RX ORDER — LISINOPRIL 40 MG/1
40 TABLET ORAL DAILY
Qty: 90 TABLET | Refills: 3 | Status: SHIPPED | OUTPATIENT
Start: 2025-06-10

## 2025-06-10 RX ORDER — AMLODIPINE BESYLATE 5 MG/1
5 TABLET ORAL DAILY
Qty: 90 TABLET | Refills: 3 | Status: SHIPPED | OUTPATIENT
Start: 2025-06-10

## 2025-06-10 RX ORDER — HYDROCHLOROTHIAZIDE 25 MG/1
25 TABLET ORAL DAILY
Qty: 90 TABLET | Refills: 3 | Status: SHIPPED | OUTPATIENT
Start: 2025-06-10

## 2025-06-10 RX ORDER — ATORVASTATIN CALCIUM 40 MG/1
40 TABLET, FILM COATED ORAL
Qty: 90 TABLET | Refills: 3 | Status: SHIPPED | OUTPATIENT
Start: 2025-06-10

## 2025-06-10 RX ORDER — METOPROLOL SUCCINATE 50 MG/1
50 TABLET, EXTENDED RELEASE ORAL DAILY
Qty: 90 TABLET | Refills: 3 | Status: SHIPPED | OUTPATIENT
Start: 2025-06-10

## 2025-06-10 RX ORDER — FENOFIBRATE 48 MG/1
48 TABLET, FILM COATED ORAL DAILY
Qty: 90 TABLET | Refills: 3 | Status: SHIPPED | OUTPATIENT
Start: 2025-06-10

## 2025-06-10 NOTE — PROGRESS NOTES
Cardiology Office Visit      Encounter Date:  06/10/2025    Patient ID:   Radha Infante is a 45 y.o. female.    Reason For Followup:  Resistant hypertension    Brief Clinical History:  Dear Dr. Dick, JEFFERY Augustine    I had the pleasure of seeing Radha Infnate today. As you are well aware, this is a 45 y.o. female past medical history that is significant for prior history of long history of hypertension that is poorly controlled with resistant hypertension here for follow-up for further evaluation and treatment options and establishing the care    Interval History:  Since his home blood pressure readings are optimal  Palpitations  Obesity  Palpitations  No exertional chest discomfort  Lower extremity edema    Assessment & Plan    Impressions:  Hypertension   Lower extremity edema  Obesity  Sleep apnea  Palpitations  Prior history of patent ductus arteriosus status post surgical repair as a child  Prior remote history of cardiac catheterization that was normal  MTHFR gene mutation      Recommendations:  Echocardiogram with normal LV systolic function  Patient is scheduled to have her CPAP for obstructive sleep apnea  Low-salt diet  Regular exercise and weight loss  Schedule for a sleep study for evaluation for obstructive sleep apnea  Likely will benefit from Edarbi chlor but insurance issues with the co-pay patient cannot afford  Current medications include aspirin 81 mg once a day Toprol-XL 50 mg p.o. once a day patient is on amlodipine 5 mg p.o. once daily lisinopril 40 mg p.o. once a day hydrochlorothiazide 25 mg p.o. once a day fenofibrate 45 mg p.o. once a day  Regular exercise and weight loss reviewed and discussed with patient  Patient is advised to consider GLP-1 analogs if needed for weight loss  Continued aggressive factor modification  Close monitoring of blood pressure at home  Workup labs medications reviewed and discussed with patient  Follow-up in office in 6  "months        Vitals:  Vitals:    06/10/25 1025   BP: (!) 163/110   BP Location: Left arm   Patient Position: Sitting   Cuff Size: Large Adult   Pulse: 70   SpO2: 100%   Weight: 115 kg (254 lb 6.4 oz)   Height: 156.2 cm (61.5\")       Physical Exam:    General: Alert, cooperative, no distress, appears stated age  Head:  Normocephalic, atraumatic, mucous membranes moist  Eyes:  Conjunctiva/corneas clear, EOM's intact     Neck:  Supple,  no adenopathy;      Lungs: Clear to auscultation bilaterally, no wheezes rhonchi rales are noted  Chest wall: No tenderness  Heart::  Regular rate and rhythm, S1 and S2 normal, no murmur, rub or gallop  Abdomen: Soft, non-tender, nondistended bowel sounds active  Extremities: No cyanosis, clubbing, or edema  Pulses: 2+ and symmetric all extremities  Skin:  No rashes or lesions  Neuro/psych: A&O x3. CN II through XII are grossly intact with appropriate affect              Lab Results   Component Value Date    GLUCOSE 103 (H) 04/02/2025    BUN 11 04/02/2025    CREATININE 0.90 04/02/2025    EGFR 80.5 04/02/2025    BCR 12.2 04/02/2025    K 3.9 04/02/2025    CO2 24.9 04/02/2025    CALCIUM 8.9 04/02/2025    ALBUMIN 3.7 04/02/2025    BILITOT 0.3 04/02/2025    AST 26 04/02/2025    ALT 21 04/02/2025     Results for orders placed during the hospital encounter of 03/27/25    Adult Transthoracic Echo Complete W/ Cont if Necessary Per Protocol    Interpretation Summary    Left ventricular systolic function is normal. Calculated left ventricular EF = 67.6%    Left ventricular wall thickness is consistent with mild concentric hypertrophy.    Left ventricular diastolic function is consistent with (grade I) impaired relaxation.    Estimated right ventricular systolic pressure from tricuspid regurgitation is normal (<35 mmHg).     No results found for this or any previous visit.     Lab Results   Component Value Date    CHOL 145 04/02/2025    TRIG 74 04/02/2025    HDL 47 04/02/2025    LDL 83 04/02/2025 "       Results for orders placed during the hospital encounter of 03/31/25    Holter Monitor - 72 Hour Up To 15 Days    Interpretation Summary    A normal monitor study.    Normal sinus rhythm    Sinus tachycardia with maximum heart rate of 145 bpm    Sinus bradycardia with minimum heart rate of 54 bpm    Rare symptomatic PACs    Rare PVCs    No sustained atrial or ventricular arrhythmias noted    Clinical correlation suggested    Patient had a min HR of 54 bpm, max HR of 145 bpm, and avg HR of 85 bpm. Predominant underlying rhythm was Sinus Rhythm. Isolated SVEs were rare (<1.0%), SVE Couplets were rare (<1.0%), and SVE Triplets were rare (<1.0%). Isolated VEs were rare (<1.0%), and no VE Couplets or VE Triplets were present.           Objective:          Allergies:  Allergies   Allergen Reactions    Iodinated Contrast Media Unknown - High Severity       Medication Review:     Current Outpatient Medications:     amLODIPine (NORVASC) 5 MG tablet, TAKE 1 TABLET BY MOUTH EVERY DAY, Disp: 90 tablet, Rfl: 0    aspirin 81 MG EC tablet, Take 1 tablet by mouth Daily., Disp: , Rfl:     atorvastatin (LIPITOR) 40 MG tablet, TAKE 1 TABLET BY MOUTH AT BEDTIME, Disp: 90 tablet, Rfl: 0    chlorhexidine (PERIDEX) 0.12 % solution, Apply 15 mL to the mouth or throat 4 (Four) Times a Day., Disp: 473 mL, Rfl: 0    fenofibrate (TRICOR) 48 MG tablet, TAKE 1 TABLET BY MOUTH EVERY DAY, Disp: 90 tablet, Rfl: 0    hydroCHLOROthiazide 25 MG tablet, Take 1 tablet by mouth Daily., Disp: 30 tablet, Rfl: 3    lisinopril (PRINIVIL,ZESTRIL) 40 MG tablet, Take 1 tablet by mouth Daily., Disp: 30 tablet, Rfl: 3    metoprolol succinate XL (TOPROL-XL) 50 MG 24 hr tablet, TAKE 1 TABLET BY MOUTH EVERY DAY, Disp: 90 tablet, Rfl: 0    Family History:  Family History   Problem Relation Age of Onset    Sleep apnea Mother     Hypertension Mother     Diabetes Father     Hypertension Father     Hyperlipidemia Father     Diabetes Maternal Grandmother     Heart  disease Maternal Grandmother     Heart disease Maternal Grandfather     Heart attack Maternal Grandfather     Hypertension Maternal Grandfather     Stroke Maternal Grandfather     Cancer Maternal Grandfather     Heart attack Paternal Grandfather     Obesity Daughter        Past Medical History:  Past Medical History:   Diagnosis Date    Heart murmur     PDA repair    Hyperlipidemia     Hypertension     MTHFR mutation (methylenetetrahydrofolate reductase)     Plantar fasciitis     S/P PDA repair     Sleep apnea 2019       Past surgical History:  Past Surgical History:   Procedure Laterality Date    CARDIAC CATHETERIZATION       SECTION      D & C AND LAPAROSCOPY      PATENT DUCTUS ARTERIOUS LIGATION         Social History:  Social History     Socioeconomic History    Marital status:    Tobacco Use    Smoking status: Never    Smokeless tobacco: Never   Vaping Use    Vaping status: Never Used   Substance and Sexual Activity    Alcohol use: Never    Drug use: Never    Sexual activity: Yes     Partners: Male     Birth control/protection: None       Review of Systems:  The following systems were reviewed as they relate to the cardiovascular system: Constitutional, Eyes, ENT, Cardiovascular, Respiratory, Gastrointestinal, Integumentary, Neurological, Psychiatric, Hematologic, Endocrine, Musculoskeletal, and Genitourinary. The pertinent cardiovascular findings are reported above with all other cardiovascular points within those systems being negative.    Diagnostic Study Review:     Current Electrocardiogram:  Procedures          NOTE: The following portions of the patient's history were reviewed and updated this visit as appropriate: allergies, current medications, past family history, past medical history, past social history, past surgical history and problem list.

## 2025-06-12 ENCOUNTER — HOSPITAL ENCOUNTER (OUTPATIENT)
Dept: SLEEP MEDICINE | Facility: HOSPITAL | Age: 45
Discharge: HOME | End: 2025-06-12
Admitting: INTERNAL MEDICINE
Payer: COMMERCIAL

## 2025-06-12 DIAGNOSIS — I1A.0 RESISTANT HYPERTENSION: ICD-10-CM

## 2025-06-12 DIAGNOSIS — R06.83 SNORING: ICD-10-CM

## 2025-06-12 DIAGNOSIS — E66.01 CLASS 3 SEVERE OBESITY DUE TO EXCESS CALORIES WITHOUT SERIOUS COMORBIDITY WITH BODY MASS INDEX (BMI) OF 40.0 TO 44.9 IN ADULT: ICD-10-CM

## 2025-06-12 DIAGNOSIS — G47.30 OBSERVED SLEEP APNEA: ICD-10-CM

## 2025-06-12 DIAGNOSIS — G47.8 NON-RESTORATIVE SLEEP: ICD-10-CM

## 2025-06-12 DIAGNOSIS — E66.813 CLASS 3 SEVERE OBESITY DUE TO EXCESS CALORIES WITHOUT SERIOUS COMORBIDITY WITH BODY MASS INDEX (BMI) OF 40.0 TO 44.9 IN ADULT: ICD-10-CM

## 2025-06-12 DIAGNOSIS — G47.19 EXCESSIVE DAYTIME SLEEPINESS: ICD-10-CM

## 2025-06-12 PROCEDURE — G0399 HOME SLEEP TEST/TYPE 3 PORTA: HCPCS

## 2025-06-17 DIAGNOSIS — R06.83 SNORING: ICD-10-CM

## 2025-06-17 DIAGNOSIS — G47.33 OSA (OBSTRUCTIVE SLEEP APNEA): Primary | ICD-10-CM

## 2025-06-17 PROCEDURE — 95806 SLEEP STUDY UNATT&RESP EFFT: CPT | Performed by: INTERNAL MEDICINE
